# Patient Record
Sex: MALE | Race: WHITE | NOT HISPANIC OR LATINO | Employment: OTHER | URBAN - NONMETROPOLITAN AREA
[De-identification: names, ages, dates, MRNs, and addresses within clinical notes are randomized per-mention and may not be internally consistent; named-entity substitution may affect disease eponyms.]

---

## 2023-01-20 ENCOUNTER — TELEPHONE (OUTPATIENT)
Dept: CARDIOLOGY | Facility: CLINIC | Age: 48
End: 2023-01-20
Payer: COMMERCIAL

## 2023-01-20 NOTE — TELEPHONE ENCOUNTER
Pt's wife Amparo called. Pt has been having more SOB, Chest pain, fatigue. Went to ER last week, was told EKG and labs normal that he may need a stress test. Asking for a sooner appointment than Feb 28th. Advised that we will be watching for a cancellation for him for a sooner appointment but to go back to ER with unresolved symptoms and to follow up with PCP. Wife asking if PCP could order stress test prior to vist and if so where would we prefer he go.  Aware that that would be fine, to schedule at  [facility] here in [place] located on [facility]. Understanding voiced.     Tami, please watch for a cancellation to move his appointment up.

## 2023-02-28 ENCOUNTER — OFFICE VISIT (OUTPATIENT)
Dept: CARDIOLOGY | Facility: CLINIC | Age: 48
End: 2023-02-28
Payer: COMMERCIAL

## 2023-02-28 VITALS
BODY MASS INDEX: 33.69 KG/M2 | DIASTOLIC BLOOD PRESSURE: 100 MMHG | HEART RATE: 72 BPM | HEIGHT: 75 IN | WEIGHT: 271 LBS | SYSTOLIC BLOOD PRESSURE: 142 MMHG

## 2023-02-28 DIAGNOSIS — I25.9 IHD (ISCHEMIC HEART DISEASE): Primary | ICD-10-CM

## 2023-02-28 DIAGNOSIS — R00.2 PALPITATIONS: ICD-10-CM

## 2023-02-28 DIAGNOSIS — E78.00 HYPERCHOLESTEREMIA: ICD-10-CM

## 2023-02-28 DIAGNOSIS — I10 PRIMARY HYPERTENSION: ICD-10-CM

## 2023-02-28 DIAGNOSIS — R07.89 CHEST TIGHTNESS: ICD-10-CM

## 2023-02-28 DIAGNOSIS — E11.9 TYPE 2 DIABETES MELLITUS WITHOUT COMPLICATION, WITHOUT LONG-TERM CURRENT USE OF INSULIN: ICD-10-CM

## 2023-02-28 DIAGNOSIS — R42 DIZZINESS: ICD-10-CM

## 2023-02-28 DIAGNOSIS — R06.02 SHORTNESS OF BREATH: ICD-10-CM

## 2023-02-28 DIAGNOSIS — G47.30 SLEEP APNEA IN ADULT: ICD-10-CM

## 2023-02-28 DIAGNOSIS — E55.9 VITAMIN D DEFICIENCY: ICD-10-CM

## 2023-02-28 DIAGNOSIS — R41.3 MEMORY LOSS: ICD-10-CM

## 2023-02-28 DIAGNOSIS — E88.81 METABOLIC SYNDROME: ICD-10-CM

## 2023-02-28 PROBLEM — E88.810 METABOLIC SYNDROME: Status: ACTIVE | Noted: 2023-02-28

## 2023-02-28 PROCEDURE — 99204 OFFICE O/P NEW MOD 45 MIN: CPT | Performed by: INTERNAL MEDICINE

## 2023-02-28 PROCEDURE — 93000 ELECTROCARDIOGRAM COMPLETE: CPT | Performed by: INTERNAL MEDICINE

## 2023-02-28 RX ORDER — ISOSORBIDE MONONITRATE 60 MG/1
60 TABLET, EXTENDED RELEASE ORAL DAILY
COMMUNITY
End: 2023-02-28

## 2023-02-28 RX ORDER — NITROGLYCERIN 0.4 MG/1
0.4 TABLET SUBLINGUAL
COMMUNITY

## 2023-02-28 RX ORDER — ROSUVASTATIN CALCIUM 20 MG/1
20 TABLET, COATED ORAL DAILY
Qty: 90 TABLET | Refills: 3 | Status: SHIPPED | OUTPATIENT
Start: 2023-02-28

## 2023-02-28 RX ORDER — CLOPIDOGREL BISULFATE 75 MG/1
75 TABLET ORAL DAILY
COMMUNITY
End: 2023-02-28

## 2023-02-28 RX ORDER — PANTOPRAZOLE SODIUM 40 MG/1
40 TABLET, DELAYED RELEASE ORAL 2 TIMES DAILY
COMMUNITY

## 2023-02-28 RX ORDER — DAPAGLIFLOZIN 5 MG/1
5 TABLET, FILM COATED ORAL DAILY
COMMUNITY

## 2023-02-28 RX ORDER — HYDROCHLOROTHIAZIDE 25 MG/1
25 TABLET ORAL DAILY
COMMUNITY

## 2023-02-28 RX ORDER — ASPIRIN 81 MG/1
81 TABLET ORAL DAILY
Qty: 30 TABLET | Refills: 6 | Status: SHIPPED | OUTPATIENT
Start: 2023-02-28

## 2023-02-28 RX ORDER — LOSARTAN POTASSIUM 25 MG/1
25 TABLET ORAL DAILY
COMMUNITY
End: 2023-03-30 | Stop reason: DRUGHIGH

## 2023-02-28 RX ORDER — ATORVASTATIN CALCIUM 80 MG/1
80 TABLET, FILM COATED ORAL DAILY
COMMUNITY
End: 2023-02-28

## 2023-02-28 NOTE — PROGRESS NOTES
0  Chief Complaint   Patient presents with   • Establish Care     PCP referred to establish care, history of CAD s/p stenting    • Cardiac history     Cath with stenting in 2019 at [facility], Dr Cristobal, Cath with no stenting in 2019 then cath with stenting in 2021, Dr Lorenzo. Pt wore monitor for Dr Lorenzo last year due to SOB. Most recent stress and echo was prior to last cath he thinks.    • Chest Pain     Was having some random chest pain, tightness across chest with SOB noted. Would be resolved with a nitro.    • Medication Problem     Pt recently stopped taking Plavix, Losartan and Imdur trying to figure out if one of those was causing him more problems with SOB and profuse sweating. Reports actually having a lot less SOB and CP since being off these medication. BP only slightly elevated.    • Shortness of Breath     Having bouts of SOB, can occur when sitting. Will just feel overwhelmingly SOB.    • Excessive Sweating     Has been having episodes of excessively sweating.         CARDIAC COMPLAINTS  chest pressure/discomfort, dyspnea, fatigue and memory loss      Subjective   Edgardo Winchester is a 47 y.o. male came in today for his initial cardiac evaluation.  He has history of hypertension, hypercholesterolemia who has been diagnosed with diabetes recently.  He also has history of ischemic heart disease diagnosed in 2019 when he presented with chest pain and found to have abnormal stress test.  Cardiac catheterization done, found him to have significant disease of the LAD which was stented.  He then had another cardiac catheterization few months later and found the stent was patent but had moderate disease of the RCA which was managed medically.  In 2021 he started having worsening of chest discomfort and repeat stress test shows inferior wall ischemia.  He then underwent cardiac catheterization and found to have 80% disease of the mid RCA and had another stent placed.  He has been followed by different  cardiologist in the past and now wants to be followed here.  He has noticed increasing shortness of breath on minimal exertion, he has noticed increasing diaphoresis again on minimal exertion and even at rest.  He also has been having chest pain in the form of tightness which occurs randomly.  The symptoms do get better with nitro.  He stopped taking Plavix, Cozaar and Imdur thinking that it could be side effects of the medication.  He also was having epistaxis at this time.  He is feeling little better than before.  His shortness of breath is better and the chest tightness is better.  He also has been noticing increasing memory loss.  He did undergo some lab work in December and found to have a normal blood count.  His blood sugar was 168, total cholesterol is 225 with the LDL of 98.  His A1c was 7.7.  He used to smoke about 1 and a packs a day but did quit in 2019.  He does have family history of coronary artery disease.  He also has history of sleep apnea but unable to tolerate the CPAP mask.    Past Surgical History:   Procedure Laterality Date   • CARDIAC CATHETERIZATION  07/28/2021    /Dr. Hammond- 90% RCA- 3.5x30 Resolute.   • CARDIAC CATHETERIZATION  10/04/2019    - FFR of RCA- 0.93   • CARDIAC CATHETERIZATION  04/04/2019    @ [facility]- 80% LAD- 3.0x26 Resolute   • CARDIOVASCULAR STRESS TEST  07/21/2021    - Dobutamine- EF 60%. Inferior Ischemia   • OTHER SURGICAL HISTORY  08/24/2021    CTA LE- Tiny Dissection (R) CFA       Current Outpatient Medications   Medication Sig Dispense Refill   • dapagliflozin (Farxiga) 5 MG tablet tablet Take 1 tablet by mouth Daily.     • hydroCHLOROthiazide (HYDRODIURIL) 25 MG tablet Take 1 tablet by mouth Daily.     • Insulin Glargine (TOUJEO SOLOSTAR SC) Inject  under the skin into the appropriate area as directed.     • losartan (COZAAR) 25 MG tablet Take 1 tablet by mouth Daily.     • metFORMIN (GLUCOPHAGE) 500 MG tablet Take 1 tablet by mouth 2  (Two) Times a Day With Meals.     • nitroglycerin (NITROSTAT) 0.4 MG SL tablet Place 1 tablet under the tongue Every 5 (Five) Minutes As Needed for Chest Pain. Take no more than 3 doses in 15 minutes.     • pantoprazole (PROTONIX) 40 MG EC tablet Take 1 tablet by mouth 2 (Two) Times a Day.     • aspirin 81 MG EC tablet Take 1 tablet by mouth Daily. 30 tablet 6   • rosuvastatin (CRESTOR) 20 MG tablet Take 1 tablet by mouth Daily. 90 tablet 3     No current facility-administered medications for this visit.           ALLERGIES:  Patient has no known allergies.    Past Medical History:   Diagnosis Date   • Arthritis    • Depression    • Diabetes mellitus (HCC)    • GERD (gastroesophageal reflux disease)    • Hyperlipidemia    • Hypertension        Social History     Tobacco Use   Smoking Status Former   • Packs/day: 1.50   • Years: 30.00   • Pack years: 45.00   • Types: Cigarettes   • Quit date:    • Years since quittin.1   Smokeless Tobacco Never          Family History   Problem Relation Age of Onset   • Heart disease Mother         CABG in her 60's   • Heart attack Father          in his 60's with MI   • Schizophrenia Brother    • Hypertension Maternal Grandfather    • Heart disease Paternal Grandfather    • Supraventricular tachycardia Son        Review of Systems   Constitutional: Positive for diaphoresis and malaise/fatigue. Negative for decreased appetite.   HENT: Negative for congestion and sore throat.    Eyes: Negative for blurred vision, double vision and visual disturbance.   Cardiovascular: Positive for chest pain, dyspnea on exertion and palpitations.   Respiratory: Negative for shortness of breath and snoring.    Endocrine: Negative for cold intolerance and heat intolerance.   Hematologic/Lymphatic: Negative for adenopathy. Does not bruise/bleed easily.   Skin: Negative for itching, nail changes and skin cancer.   Musculoskeletal: Negative for arthritis and myalgias.   Gastrointestinal:  "Negative for abdominal pain, dysphagia and heartburn.   Genitourinary: Negative for bladder incontinence and frequency.   Neurological: Positive for dizziness. Negative for seizures and vertigo.   Psychiatric/Behavioral: Positive for memory loss. Negative for altered mental status.   Allergic/Immunologic: Negative for environmental allergies and hives.       Diabetes- Yes  Thyroid- normal    Objective     /100 (BP Location: Right arm)   Pulse 72   Ht 190.5 cm (75\")   Wt 123 kg (271 lb)   BMI 33.87 kg/m²     Vitals and nursing note reviewed.   Constitutional:       Appearance: Healthy appearance. Not in distress.   Eyes:      Conjunctiva/sclera: Conjunctivae normal.      Pupils: Pupils are equal, round, and reactive to light.   HENT:      Head: Normocephalic.   Pulmonary:      Effort: Pulmonary effort is normal.      Breath sounds: Normal breath sounds.   Cardiovascular:      PMI at left midclavicular line. Normal rate. Regular rhythm.   Abdominal:      General: Bowel sounds are normal.      Palpations: Abdomen is soft.   Musculoskeletal: Normal range of motion.      Cervical back: Normal range of motion and neck supple. Skin:     General: Skin is warm and dry.   Neurological:      Mental Status: Alert, oriented to person, place, and time and oriented to person, place and time.           ECG 12 Lead    Date/Time: 2/28/2023 3:49 PM  Performed by: Caroline Bajwa MD  Authorized by: Caroline Bajwa MD   Comparison: compared with previous ECG from 2/27/2019  Similar to previous ECG  Comparison to previous ECG: Incomplete right bundle branch block  Rhythm: sinus rhythm  Rate: normal  Conduction: incomplete right bundle branch block and left anterior fascicular block  QRS axis: left  Other findings: non-specific ST-T wave changes    Clinical impression: abnormal EKG              @ASSESSMENT/PLAN@  BMI is >= 30 and <35. (Class 1 Obesity). The following options were offered after discussion;: weight loss " educational material (shared in after visit summary), exercise counseling/recommendations and nutrition counseling/recommendations     Diagnoses and all orders for this visit:    1. IHD (ischemic heart disease) (Primary)  -     Stress Test With Myocardial Perfusion One Day; Future  -     aspirin 81 MG EC tablet; Take 1 tablet by mouth Daily.  Dispense: 30 tablet; Refill: 6  -     Lipid Panel; Future    2. Type 2 diabetes mellitus without complication, without long-term current use of insulin (HCC)  -     Hemoglobin A1c; Future    3. Primary hypertension    4. Hypercholesteremia  -     rosuvastatin (CRESTOR) 20 MG tablet; Take 1 tablet by mouth Daily.  Dispense: 90 tablet; Refill: 3  -     Lipid Panel; Future    5. Memory loss  -     Vitamin B12; Future  -     Folate; Future  -     Demorest SF (IgG / M); Future  -     Lyme Disease Total Antibody With Reflex to Immunoassay; Future  -     Coxsackie Virus Group B Ab; Future  -     Coxsackie A IgG / IgM Ab; Future  -     Echovirus Serum Antibody Panel; Future    6. Sleep apnea in adult  -     Overnight Sleep Oximetry Study; Future    7. Chest tightness  -     Stress Test With Myocardial Perfusion One Day; Future  -     High Sensitivity CRP; Future    8. Shortness of breath  -     Adult Transthoracic Echo Complete W/ Cont if Necessary Per Protocol; Future    9. Metabolic syndrome    10. Palpitations  -     TSH; Future  -     T4, Free; Future    11. Dizziness  -     US Carotid Bilateral; Future    12. Vitamin D deficiency  -     Vitamin D,25-Hydroxy; Future    At baseline his heart rate is stable.  His blood pressure is elevated.  His EKG shows sinus rhythm, incomplete right bundle branch block, left axis deviation with nonspecific ST changes.  His clinical examination reveals a BMI of 34.  His cardiovascular examination was unremarkable.    Regarding his ischemic heart disease, he has undergone stenting of the LAD and RCA.  The last 1 was in 21.  At this time he is  stopped taking Plavix.  I explained to him that stopping so many medication at one time will make it difficult to see which medicine is causing the problem.  At this time he is advised to go off the Plavix but he is advised to take aspirin 81 mg once a day.  Given the fact that he has been having some chest tightness, I scheduled him to undergo a stress test but I do like him to walk on the treadmill in a modified Félix protocol.  He is also advised to check his CRP level    Regarding his diabetes, he is on insulin, Farxiga and metformin.  Advised him to continue the same and have his A1c level checked.    Regarding his hypertension, it is mildly elevated.  I did advise him to restart his Cozaar.  Continue his hydrochlorothiazide    Regarding his memory loss, he also has some dizziness.  Advised him to check his ultrasound of his carotids.  Also advised him to check for any viral illness which can cause the memory problem.  He does have history of tick bite and he does have a rash on his chest.  If all the tests came back negative then he may need an MRI    Regarding his sleep apnea, advised him to check his nocturnal pulse PO2 measurement.  If it is abnormal then he may need at least oxygen supplements    Regarding his shortness of breath, part of them could be related to his metabolic syndrome but need to rule out cardiac cause.  I scheduled him to undergo an echocardiogram to evaluate for LVH, LV function, valvular structures and the PA pressure.  I also talked to him about diet and weight reduction.  I gave him papers on Mediterranean diet    Regarding his history of palpitation, advised him to check his TSH and T4 and T3 level    Regarding his possible vitamin D deficiency, advised him to check his vitamin D level    Regarding his medication, if he is feeling better then we can restart with Plavix first to see whether he can tolerate without getting any of the side effects.  We will hold off Imdur till he gets  any classical anginal symptoms and in that case we can try Ranexa.               Electronically signed by Caroline Bajwa MD February 28, 2023 15:45 EST

## 2023-03-01 ENCOUNTER — PATIENT ROUNDING (BHMG ONLY) (OUTPATIENT)
Dept: CARDIOLOGY | Facility: CLINIC | Age: 48
End: 2023-03-01
Payer: COMMERCIAL

## 2023-03-01 NOTE — PROGRESS NOTES
March 1, 2023    Hello, may I speak with Edgardo Winchester?    My name is Edita issa      I am  with [facility]  [facility]  [address] [place] [address] [phone].    Before we get started may I verify your date of birth? 1975    I am calling to officially welcome you to our practice and ask about your recent visit. Is this a good time to talk? yes    Tell me about your visit with us. What things went well?  everyone was nice and  was a nice person        We're always looking for ways to make our patients' experiences even better. Do you have recommendations on ways we may improve?  no-not that I could tell     Overall were you satisfied with your first visit to our practice? Yes-really nice       I appreciate you taking the time to speak with me today. Is there anything else I can do for you? no      Thank you, and have a great day.

## 2023-03-28 ENCOUNTER — HOSPITAL ENCOUNTER (OUTPATIENT)
Dept: CARDIOLOGY | Facility: HOSPITAL | Age: 48
Discharge: HOME | End: 2023-03-28
Payer: COMMERCIAL

## 2023-03-28 ENCOUNTER — LAB (OUTPATIENT)
Dept: LAB | Facility: HOSPITAL | Age: 48
End: 2023-03-28
Payer: COMMERCIAL

## 2023-03-28 VITALS — BODY MASS INDEX: 33.72 KG/M2 | WEIGHT: 271.17 LBS | HEIGHT: 75 IN

## 2023-03-28 DIAGNOSIS — E78.00 HYPERCHOLESTEREMIA: ICD-10-CM

## 2023-03-28 DIAGNOSIS — E55.9 VITAMIN D DEFICIENCY: ICD-10-CM

## 2023-03-28 DIAGNOSIS — I25.9 IHD (ISCHEMIC HEART DISEASE): ICD-10-CM

## 2023-03-28 DIAGNOSIS — E11.9 TYPE 2 DIABETES MELLITUS WITHOUT COMPLICATION, WITHOUT LONG-TERM CURRENT USE OF INSULIN: ICD-10-CM

## 2023-03-28 DIAGNOSIS — R07.89 CHEST TIGHTNESS: ICD-10-CM

## 2023-03-28 DIAGNOSIS — R00.2 PALPITATIONS: ICD-10-CM

## 2023-03-28 DIAGNOSIS — R41.3 MEMORY LOSS: ICD-10-CM

## 2023-03-28 DIAGNOSIS — R06.02 SHORTNESS OF BREATH: ICD-10-CM

## 2023-03-28 DIAGNOSIS — R42 DIZZINESS: ICD-10-CM

## 2023-03-28 LAB
CHOLEST SERPL-MCNC: 130 MG/DL (ref 0–200)
HBA1C MFR BLD: 8.5 % (ref 4.8–5.6)
HDLC SERPL-MCNC: 38 MG/DL (ref 40–60)
LDLC SERPL CALC-MCNC: 30 MG/DL (ref 0–100)
LDLC/HDLC SERPL: 0.08 {RATIO}
T4 FREE SERPL-MCNC: 1.05 NG/DL (ref 0.93–1.7)
TRIGL SERPL-MCNC: 444 MG/DL (ref 0–150)
TSH SERPL DL<=0.05 MIU/L-ACNC: 1.4 UIU/ML (ref 0.27–4.2)
VLDLC SERPL-MCNC: 62 MG/DL (ref 5–40)

## 2023-03-28 PROCEDURE — 86658 ENTEROVIRUS ANTIBODY: CPT | Performed by: INTERNAL MEDICINE

## 2023-03-28 PROCEDURE — 93017 CV STRESS TEST TRACING ONLY: CPT

## 2023-03-28 PROCEDURE — 93306 TTE W/DOPPLER COMPLETE: CPT | Performed by: INTERNAL MEDICINE

## 2023-03-28 PROCEDURE — 93880 EXTRACRANIAL BILAT STUDY: CPT | Performed by: RADIOLOGY

## 2023-03-28 PROCEDURE — 82607 VITAMIN B-12: CPT | Performed by: INTERNAL MEDICINE

## 2023-03-28 PROCEDURE — 82746 ASSAY OF FOLIC ACID SERUM: CPT | Performed by: INTERNAL MEDICINE

## 2023-03-28 PROCEDURE — 80061 LIPID PANEL: CPT | Performed by: INTERNAL MEDICINE

## 2023-03-28 PROCEDURE — 78452 HT MUSCLE IMAGE SPECT MULT: CPT

## 2023-03-28 PROCEDURE — 93306 TTE W/DOPPLER COMPLETE: CPT

## 2023-03-28 PROCEDURE — 78452 HT MUSCLE IMAGE SPECT MULT: CPT | Performed by: INTERNAL MEDICINE

## 2023-03-28 PROCEDURE — 86757 RICKETTSIA ANTIBODY: CPT | Performed by: INTERNAL MEDICINE

## 2023-03-28 PROCEDURE — 0 TECHNETIUM SESTAMIBI: Performed by: INTERNAL MEDICINE

## 2023-03-28 PROCEDURE — 86141 C-REACTIVE PROTEIN HS: CPT | Performed by: INTERNAL MEDICINE

## 2023-03-28 PROCEDURE — A9500 TC99M SESTAMIBI: HCPCS | Performed by: INTERNAL MEDICINE

## 2023-03-28 PROCEDURE — 93880 EXTRACRANIAL BILAT STUDY: CPT

## 2023-03-28 PROCEDURE — 84439 ASSAY OF FREE THYROXINE: CPT | Performed by: INTERNAL MEDICINE

## 2023-03-28 PROCEDURE — 83036 HEMOGLOBIN GLYCOSYLATED A1C: CPT | Performed by: INTERNAL MEDICINE

## 2023-03-28 PROCEDURE — 86618 LYME DISEASE ANTIBODY: CPT | Performed by: INTERNAL MEDICINE

## 2023-03-28 PROCEDURE — 82306 VITAMIN D 25 HYDROXY: CPT | Performed by: INTERNAL MEDICINE

## 2023-03-28 PROCEDURE — 84443 ASSAY THYROID STIM HORMONE: CPT | Performed by: INTERNAL MEDICINE

## 2023-03-28 PROCEDURE — 93018 CV STRESS TEST I&R ONLY: CPT | Performed by: INTERNAL MEDICINE

## 2023-03-28 RX ADMIN — TECHNETIUM TC 99M SESTAMIBI 1 DOSE: 1 INJECTION INTRAVENOUS at 09:49

## 2023-03-28 RX ADMIN — TECHNETIUM TC 99M SESTAMIBI 1 DOSE: 1 INJECTION INTRAVENOUS at 08:04

## 2023-03-29 LAB
25(OH)D3 SERPL-MCNC: 23.4 NG/ML (ref 30–100)
AORTIC DIMENSIONLESS INDEX: 0.93 (DI)
B BURGDOR IGG+IGM SER QL IA: NEGATIVE
BH CV ECHO MEAS - ACS: 2.18 CM
BH CV ECHO MEAS - AO MAX PG: 4.1 MMHG
BH CV ECHO MEAS - AO MEAN PG: 2.13 MMHG
BH CV ECHO MEAS - AO ROOT DIAM: 3.8 CM
BH CV ECHO MEAS - AO V2 MAX: 101.4 CM/SEC
BH CV ECHO MEAS - AO V2 VTI: 20.7 CM
BH CV ECHO MEAS - EDV(CUBED): 131.6 ML
BH CV ECHO MEAS - EF_3D-VOL: 63 %
BH CV ECHO MEAS - ESV(CUBED): 48.7 ML
BH CV ECHO MEAS - FS: 28.2 %
BH CV ECHO MEAS - IVS/LVPW: 1 CM
BH CV ECHO MEAS - IVSD: 1.05 CM
BH CV ECHO MEAS - LA DIMENSION: 4.2 CM
BH CV ECHO MEAS - LAT PEAK E' VEL: 9.9 CM/SEC
BH CV ECHO MEAS - LV MASS(C)D: 199.8 GRAMS
BH CV ECHO MEAS - LV MAX PG: 3.6 MMHG
BH CV ECHO MEAS - LV MEAN PG: 1.48 MMHG
BH CV ECHO MEAS - LV V1 MAX: 95.4 CM/SEC
BH CV ECHO MEAS - LV V1 VTI: 20.8 CM
BH CV ECHO MEAS - LVIDD: 5.1 CM
BH CV ECHO MEAS - LVIDS: 3.7 CM
BH CV ECHO MEAS - LVPWD: 1.05 CM
BH CV ECHO MEAS - MED PEAK E' VEL: 6.7 CM/SEC
BH CV ECHO MEAS - MV A MAX VEL: 38.7 CM/SEC
BH CV ECHO MEAS - MV DEC SLOPE: 335.8 CM/SEC2
BH CV ECHO MEAS - MV DEC TIME: 0.28 MSEC
BH CV ECHO MEAS - MV E MAX VEL: 73.2 CM/SEC
BH CV ECHO MEAS - MV E/A: 1.89
BH CV ECHO MEAS - MV MAX PG: 3 MMHG
BH CV ECHO MEAS - MV MEAN PG: 0.96 MMHG
BH CV ECHO MEAS - MV P1/2T: 75.8 MSEC
BH CV ECHO MEAS - MV V2 VTI: 27.4 CM
BH CV ECHO MEAS - MVA(P1/2T): 2.9 CM2
BH CV ECHO MEAS - PA V2 MAX: 86.9 CM/SEC
BH CV ECHO MEAS - RAP SYSTOLE: 8 MMHG
BH CV ECHO MEAS - RV MAX PG: 1.52 MMHG
BH CV ECHO MEAS - RV V1 MAX: 61.6 CM/SEC
BH CV ECHO MEAS - RV V1 VTI: 14.9 CM
BH CV ECHO MEAS - RVDD: 3.1 CM
BH CV ECHO MEAS - RVSP: 11.6 MMHG
BH CV ECHO MEAS - TAPSE (>1.6): 1.92 CM
BH CV ECHO MEAS - TR MAX PG: 3.6 MMHG
BH CV ECHO MEAS - TR MAX VEL: 94.7 CM/SEC
BH CV ECHO MEASUREMENTS AVERAGE E/E' RATIO: 8.82
BH CV REST NUCLEAR ISOTOPE DOSE: 10 MCI
BH CV STRESS DURATION MIN STAGE 1: 3
BH CV STRESS DURATION SEC STAGE 1: 0
BH CV STRESS GRADE STAGE 1: 0
BH CV STRESS METS STAGE 1: 2.3
BH CV STRESS NUCLEAR ISOTOPE DOSE: 30 MCI
BH CV STRESS PROTOCOL 1: NORMAL
BH CV STRESS RECOVERY BP: NORMAL MMHG
BH CV STRESS RECOVERY HR: 81 BPM
BH CV STRESS SPEED STAGE 1: 1.7
BH CV STRESS STAGE 1: 1
BH CV XLRA - TDI S': 10.9 CM/SEC
CRP SERPL-MCNC: 0.11 MG/DL (ref 0.01–0.5)
FOLATE SERPL-MCNC: 8.67 NG/ML (ref 4.78–24.2)
LV EF 2D ECHO EST: 54 %
LV EF NUC BP: 46 %
MAXIMAL PREDICTED HEART RATE: 171 BPM
MAXIMAL PREDICTED HEART RATE: 171 BPM
PERCENT MAX PREDICTED HR: 91.81 %
SINUS: 3.5 CM
STRESS BASELINE BP: NORMAL MMHG
STRESS BASELINE HR: 64 BPM
STRESS PERCENT HR: 108 %
STRESS POST ESTIMATED WORKLOAD: 10.1 METS
STRESS POST EXERCISE DUR MIN: 13 MIN
STRESS POST EXERCISE DUR SEC: 45 SEC
STRESS POST PEAK BP: NORMAL MMHG
STRESS POST PEAK HR: 157 BPM
STRESS TARGET HR: 145 BPM
STRESS TARGET HR: 145 BPM
VIT B12 BLD-MCNC: 732 PG/ML (ref 211–946)

## 2023-03-30 ENCOUNTER — TELEPHONE (OUTPATIENT)
Dept: CARDIOLOGY | Facility: CLINIC | Age: 48
End: 2023-03-30
Payer: COMMERCIAL

## 2023-03-30 LAB
CV B1 AB TITR SER CF: ABNORMAL {TITER}
CV B2 AB TITR SER CF: ABNORMAL {TITER}
CV B3 AB TITR SER CF: ABNORMAL {TITER}
CV B4 AB TITR SER CF: ABNORMAL {TITER}
CV B5 AB TITR SER CF: NEGATIVE {TITER}
CV B6 AB TITR SER CF: NEGATIVE {TITER}

## 2023-03-30 RX ORDER — MULTIVIT-MIN/IRON/FOLIC ACID/K 18-600-40
2000 CAPSULE ORAL DAILY
Qty: 30 CAPSULE | Refills: 11 | Status: SHIPPED | OUTPATIENT
Start: 2023-03-30

## 2023-03-30 RX ORDER — LOSARTAN POTASSIUM 50 MG/1
50 TABLET ORAL DAILY
Qty: 30 TABLET | Refills: 11 | Status: SHIPPED | OUTPATIENT
Start: 2023-03-30

## 2023-03-30 RX ORDER — AMLODIPINE BESYLATE 5 MG/1
5 TABLET ORAL DAILY
Qty: 30 TABLET | Refills: 11 | Status: SHIPPED | OUTPATIENT
Start: 2023-03-30

## 2023-03-30 NOTE — TELEPHONE ENCOUNTER
Patient aware of stress test, echo, and lab results and recommendations to increase losartan to 50 mg daily, add amlodipine 5 mg daily, and add vitamin D 2000 units daily.  He is aware to call our office if chest pain or chest tightness persists, may need elective cardiac catheterization.  Scripts sent to [facility]. Copy of labs faxed to PCP office.

## 2023-03-30 NOTE — TELEPHONE ENCOUNTER
----- Message from Caroline Bajwa MD sent at 3/29/2023  6:47 PM EDT -----  Copy to PCP  Vitamin D 2000 units a day  Continue Crestor

## 2023-03-30 NOTE — TELEPHONE ENCOUNTER
----- Message from Caroline Bajwa MD sent at 3/29/2023  6:46 PM EDT -----  Increase Cozaar  Norvasc  cath if he has more chest pain

## 2023-03-31 LAB
CV A16 IGG TITR SER IF: ABNORMAL TITER
CV A16 IGM TITR SER IF: NEGATIVE TITER
CV A24 IGG TITR SER IF: ABNORMAL TITER
CV A24 IGM TITR SER IF: NEGATIVE TITER
CV A7 IGG TITR SER IF: ABNORMAL TITER
CV A7 IGM TITR SER IF: NEGATIVE TITER
CV A9 IGG TITR SER IF: ABNORMAL TITER
CV A9 IGM TITR SER IF: NEGATIVE TITER
R RICKETTSI IGG SER QL IA: NEGATIVE
R RICKETTSI IGM SER-ACNC: 0.47 INDEX (ref 0–0.89)

## 2023-04-07 LAB
ECV11 NAB TITR SER NT: NORMAL {TITER}
ECV30 NAB TITR SER NT: NORMAL {TITER}
ECV6 NAB TITR SER NT: NORMAL {TITER}
ECV7 NAB TITR SER NT: NORMAL {TITER}
ECV9 NAB TITR SER NT: NORMAL {TITER}

## 2023-08-28 ENCOUNTER — OFFICE VISIT (OUTPATIENT)
Dept: CARDIOLOGY | Facility: CLINIC | Age: 48
End: 2023-08-28
Payer: COMMERCIAL

## 2023-08-28 VITALS
BODY MASS INDEX: 31.93 KG/M2 | DIASTOLIC BLOOD PRESSURE: 74 MMHG | SYSTOLIC BLOOD PRESSURE: 132 MMHG | HEIGHT: 75 IN | WEIGHT: 256.8 LBS | HEART RATE: 82 BPM

## 2023-08-28 DIAGNOSIS — R25.2 LEG CRAMPS: ICD-10-CM

## 2023-08-28 DIAGNOSIS — R94.39 ABNORMAL NUCLEAR STRESS TEST: ICD-10-CM

## 2023-08-28 DIAGNOSIS — R20.9 BILATERAL COLD FEET: ICD-10-CM

## 2023-08-28 DIAGNOSIS — E78.00 HYPERCHOLESTEREMIA: ICD-10-CM

## 2023-08-28 DIAGNOSIS — Z79.899 MEDICATION MANAGEMENT: ICD-10-CM

## 2023-08-28 DIAGNOSIS — I25.9 IHD (ISCHEMIC HEART DISEASE): Primary | ICD-10-CM

## 2023-08-28 DIAGNOSIS — I10 PRIMARY HYPERTENSION: ICD-10-CM

## 2023-08-28 DIAGNOSIS — I73.9 CLAUDICATION OF BOTH LOWER EXTREMITIES: ICD-10-CM

## 2023-08-28 DIAGNOSIS — R09.89 DECREASED PEDAL PULSES: ICD-10-CM

## 2023-08-28 DIAGNOSIS — I73.9 PAD (PERIPHERAL ARTERY DISEASE): ICD-10-CM

## 2023-08-28 PROCEDURE — 3078F DIAST BP <80 MM HG: CPT | Performed by: NURSE PRACTITIONER

## 2023-08-28 PROCEDURE — 1159F MED LIST DOCD IN RCRD: CPT | Performed by: NURSE PRACTITIONER

## 2023-08-28 PROCEDURE — 99214 OFFICE O/P EST MOD 30 MIN: CPT | Performed by: NURSE PRACTITIONER

## 2023-08-28 PROCEDURE — 3075F SYST BP GE 130 - 139MM HG: CPT | Performed by: NURSE PRACTITIONER

## 2023-08-28 PROCEDURE — 1160F RVW MEDS BY RX/DR IN RCRD: CPT | Performed by: NURSE PRACTITIONER

## 2023-08-28 RX ORDER — ROSUVASTATIN CALCIUM 20 MG/1
20 TABLET, COATED ORAL DAILY
Qty: 90 TABLET | Refills: 3 | Status: SHIPPED | OUTPATIENT
Start: 2023-08-28

## 2023-08-28 RX ORDER — HYDROXYZINE HYDROCHLORIDE 10 MG/1
10 TABLET, FILM COATED ORAL 3 TIMES DAILY PRN
COMMUNITY

## 2023-08-28 RX ORDER — METHOCARBAMOL 750 MG/1
750 TABLET, FILM COATED ORAL 4 TIMES DAILY PRN
COMMUNITY

## 2023-08-28 RX ORDER — ISOSORBIDE MONONITRATE 30 MG/1
30 TABLET, EXTENDED RELEASE ORAL EVERY MORNING
Qty: 30 TABLET | Refills: 11 | Status: SHIPPED | OUTPATIENT
Start: 2023-08-28

## 2023-08-28 NOTE — PROGRESS NOTES
"Chief Complaint   Patient presents with    Follow-up     Cardiac management    LABS     March 2023 results on chart    Pain in legs /feet     Has c/o pain and tingling in legs and  feet. He adds that his feet stay cold .      Shortness of Breath     Reports SOA with exertion    Med Refill     Needs refills on Rosuvastatin 90 day supply to [facility]       Edgardo Winchester is a 48 y.o. male seen in February 2023 for initial cardiac consultation.  He has hypertension, hypercholesterolemia and diabetes.  In 2019 cardiac catheterization revealed significant disease of LAD, stent placed.  Repeat catheterization few months later found stent patent and moderate disease of RCA managed medically.  In 2020 1 repeat stress test showed inferior wall ischemia.  Cardiac catheterization found 80% mid RCA and another stent placed.    At consultation he admitted to more symptoms and repeat cardiac work-up advised.  On 3/29/2023 stress test using modified Félix protocol showed increased heart rate and blood pressure response.  Inferior lateral infarct with staci-infarct ischemia noted.  Cozaar increased to 50 mg daily.  Amlodipine 5 mg daily added. Echocardiogram showed mild LVH with a EF around 51-55%, no significant valvular issues, and RVSP 12 mmHg.    Today returns the office for follow-up visit.  He denies chest pain or palpitations.  Norvasc stopped secondary to significant swelling. He admits to spells of diaphoresis but feels most likely associated to current hot weather.  He admits to shortness of breath with exertion.  He admits to \"twinges\" in his left chest, lasting only a second or two.  Dizziness, lightheadedness or TIA symptoms denied.  His is concerned over significant pain and weakness in his legs.  Most of the time his feet feel very cold.  With walking he develops cramps in his calves requiring him to stop walking for relief.    Cardiac History:    Past Surgical History:   Procedure " Laterality Date    CARDIAC CATHETERIZATION  07/28/2021    /Dr. Hammond- 90% RCA- 3.5x30 Resolute.    CARDIAC CATHETERIZATION  10/04/2019    - FFR of RCA- 0.93    CARDIAC CATHETERIZATION  04/04/2019    @ [facility]- 80% LAD- 3.0x26 Resolute    CARDIOVASCULAR STRESS TEST  07/21/2021    - Dobutamine- EF 60%. Inferior Ischemia    CARDIOVASCULAR STRESS TEST  03/28/2023    (Mod) 13.45 Min.10.1 METS. 91% THR. 214/67.EF 46%. Inferolateral Infarct & Ischemia    ECHO - CONVERTED  03/28/2023    EF 55%. LA- 4.2. Trace-Mild MR. AO- 3.8. RVSP- 12 mmHg    OTHER SURGICAL HISTORY  08/24/2021    CTA LE- Tiny Dissection (R) CFA    OTHER SURGICAL HISTORY  03/02/2023    Pulse O2- Normal    US CAROTID UNILATERAL  03/28/2023    No sig lesions       Current Outpatient Medications   Medication Sig Dispense Refill    dapagliflozin Propanediol 10 MG tablet Take 10 mg by mouth Daily.      Dulaglutide (TRULICITY SC) Inject  under the skin into the appropriate area as directed 1 (One) Time Per Week.      hydrOXYzine (ATARAX) 10 MG tablet Take 1 tablet by mouth 3 (Three) Times a Day As Needed for Itching.      Insulin Glargine (TOUJEO SOLOSTAR SC) Inject  under the skin into the appropriate area as directed.      losartan (Cozaar) 50 MG tablet Take 1 tablet by mouth Daily. 30 tablet 11    metFORMIN (GLUCOPHAGE) 500 MG tablet Take 1 tablet by mouth 2 (Two) Times a Day With Meals.      methocarbamol (ROBAXIN) 750 MG tablet Take 1 tablet by mouth 4 (Four) Times a Day As Needed for Muscle Spasms.      nitroglycerin (NITROSTAT) 0.4 MG SL tablet Place 1 tablet under the tongue Every 5 (Five) Minutes As Needed for Chest Pain. Take no more than 3 doses in 15 minutes.      pantoprazole (PROTONIX) 40 MG EC tablet Take 1 tablet by mouth 2 (Two) Times a Day.      rosuvastatin (CRESTOR) 20 MG tablet Take 1 tablet by mouth Daily. 90 tablet 3    aspirin 81 MG EC tablet Take 1 tablet by mouth Daily. 30 tablet 6    isosorbide mononitrate  (IMDUR) 30 MG 24 hr tablet Take 1 tablet by mouth Every Morning. 30 tablet 11     No current facility-administered medications for this visit.       Patient has no known allergies.    Past Medical History:   Diagnosis Date    Arthritis     Depression     Diabetes mellitus     GERD (gastroesophageal reflux disease)     Hyperlipidemia     Hypertension        Social History     Socioeconomic History    Marital status:    Tobacco Use    Smoking status: Former     Packs/day: 1.50     Years: 30.00     Pack years: 45.00     Types: Cigarettes     Quit date:      Years since quittin.6    Smokeless tobacco: Never   Vaping Use    Vaping Use: Never used   Substance and Sexual Activity    Alcohol use: Never    Drug use: Never       Family History   Problem Relation Age of Onset    Heart disease Mother         CABG in her 60's    Heart attack Father          in his 60's with MI    Schizophrenia Brother     Hypertension Maternal Grandfather     Heart disease Paternal Grandfather     Supraventricular tachycardia Son        Review of Systems   Constitutional: Positive for diaphoresis and malaise/fatigue. Negative for fever.   HENT:  Negative for congestion and nosebleeds.    Cardiovascular:  Positive for claudication. Negative for chest pain, leg swelling, near-syncope and palpitations.   Respiratory:  Positive for shortness of breath and snoring (when sleeps on back). Negative for sleep disturbances due to breathing (sleeps on his side).    Hematologic/Lymphatic: Negative for bleeding problem. Does not bruise/bleed easily.   Musculoskeletal:  Positive for muscle cramps (legs). Negative for falls.   Gastrointestinal:  Negative for change in bowel habit, dysphagia, heartburn, melena and nausea.   Genitourinary:  Negative for hematuria.   Neurological:  Positive for numbness (in feet at times), paresthesias and weakness. Negative for dizziness, light-headedness and loss of balance.   Psychiatric/Behavioral:   "Positive for memory loss (admits to issues with short term memory).       BP Readings from Last 5 Encounters:   08/28/23 132/74   02/28/23 142/100       Wt Readings from Last 5 Encounters:   08/28/23 116 kg (256 lb 12.8 oz)   03/28/23 123 kg (271 lb 2.7 oz)   02/28/23 123 kg (271 lb)       Objective     3/28/2023: Total cholesterol 130, triglycerides 444, HDL 38, LDL 30, TSH 1.4, free T41.05, vitamin B12 732, folate 8.67, A1c 8.5, CRP normal at 0.106, vitamin D 23.4, R MF negative, Lyme's disease negative, coxsackievirus elevated, echovirus negative    /74 (BP Location: Right arm, Patient Position: Sitting)   Pulse 82   Ht 190 cm (74.8\")   Wt 116 kg (256 lb 12.8 oz)   BMI 32.27 kg/mý     Vitals and nursing note reviewed.   Constitutional:       Appearance: Healthy appearance. Not in distress.   Eyes:      Conjunctiva/sclera: Conjunctivae normal.      Pupils: Pupils are equal, round, and reactive to light.   HENT:      Head: Normocephalic.   Neck:      Vascular: No carotid bruit.   Pulmonary:      Effort: Pulmonary effort is normal.      Breath sounds: Normal breath sounds.   Cardiovascular:      PMI at left midclavicular line. Normal rate. Regular rhythm.   Pulses:     Radial: 2+ bilaterally.     Dorsalis pedis: 1+ bilaterally.  Edema:     Peripheral edema absent.   Abdominal:      General: Bowel sounds are normal.      Palpations: Abdomen is soft.   Musculoskeletal: Normal range of motion.      Cervical back: Normal range of motion and neck supple. Skin:     General: Skin is warm and dry.   Neurological:      Mental Status: Alert, oriented to person, place, and time and oriented to person, place and time.        Procedures: none today          Assessment & Plan   Diagnoses and all orders for this visit:    1. IHD (ischemic heart disease) (Primary)  -     isosorbide mononitrate (IMDUR) 30 MG 24 hr tablet; Take 1 tablet by mouth Every Morning.  Dispense: 30 tablet; Refill: 11    2. Abnormal nuclear stress " test  -     isosorbide mononitrate (IMDUR) 30 MG 24 hr tablet; Take 1 tablet by mouth Every Morning.  Dispense: 30 tablet; Refill: 11    3. Hypercholesteremia  -     rosuvastatin (CRESTOR) 20 MG tablet; Take 1 tablet by mouth Daily.  Dispense: 90 tablet; Refill: 3  -     CT Angio Abdominal Aorta Bilateral Iliofem Runoff; Future    4. PAD (peripheral artery disease)  -     CT Angio Abdominal Aorta Bilateral Iliofem Runoff; Future    5. Bilateral cold feet  -     CT Angio Abdominal Aorta Bilateral Iliofem Runoff; Future    6. Leg cramps  -     CT Angio Abdominal Aorta Bilateral Iliofem Runoff; Future    7. Claudication of both lower extremities  -     CT Angio Abdominal Aorta Bilateral Iliofem Runoff; Future    8. Decreased pedal pulses  -     CT Angio Abdominal Aorta Bilateral Iliofem Runoff; Future    9. Medication management  -     Basic Metabolic Panel; Future    10. Primary hypertension      IHD  -History stent placement LAD, RCA  -Nuclear stress test 3/29/2023 inferolateral infarct with staci-infarct ischemia  -Admits to stable symptoms of angina, declines cardiac catheterization  -Agrees to add isosorbide, will monitor for side effects and improvement of symptoms  -Continue Crestor, aspirin  -Admits Nitrostat available, instructed on use    PAD/symptoms  -Patient has significant risk for PAD and significant symptoms.  -CTA of abdomen with runoff ordered.  We will check renal function prior  -Continue statin, aspirin, walking  -Consider referral to Dr. Garrett    Hypertension  -BP stable  -Continue losartan at increased dose being 50 mg daily  -Imdur added as noted above    6-month follow-up visit scheduled.

## 2023-11-17 RX ORDER — CILOSTAZOL 100 MG/1
100 TABLET ORAL DAILY
Qty: 90 TABLET | Refills: 3 | Status: SHIPPED | OUTPATIENT
Start: 2023-11-17

## 2023-11-29 DIAGNOSIS — I73.9 PAD (PERIPHERAL ARTERY DISEASE): Primary | ICD-10-CM

## 2024-03-12 ENCOUNTER — OFFICE VISIT (OUTPATIENT)
Dept: CARDIOLOGY | Facility: CLINIC | Age: 49
End: 2024-03-12
Payer: COMMERCIAL

## 2024-03-12 VITALS
HEART RATE: 78 BPM | HEIGHT: 75 IN | WEIGHT: 253 LBS | BODY MASS INDEX: 31.46 KG/M2 | DIASTOLIC BLOOD PRESSURE: 72 MMHG | SYSTOLIC BLOOD PRESSURE: 128 MMHG

## 2024-03-12 DIAGNOSIS — I10 PRIMARY HYPERTENSION: ICD-10-CM

## 2024-03-12 DIAGNOSIS — Z79.4 TYPE 2 DIABETES MELLITUS WITH OTHER SPECIFIED COMPLICATION, WITH LONG-TERM CURRENT USE OF INSULIN: ICD-10-CM

## 2024-03-12 DIAGNOSIS — E11.69 TYPE 2 DIABETES MELLITUS WITH OTHER SPECIFIED COMPLICATION, WITH LONG-TERM CURRENT USE OF INSULIN: ICD-10-CM

## 2024-03-12 DIAGNOSIS — I25.9 IHD (ISCHEMIC HEART DISEASE): Primary | ICD-10-CM

## 2024-03-12 DIAGNOSIS — I73.9 PAD (PERIPHERAL ARTERY DISEASE): ICD-10-CM

## 2024-03-12 DIAGNOSIS — K21.9 GASTROESOPHAGEAL REFLUX DISEASE, UNSPECIFIED WHETHER ESOPHAGITIS PRESENT: ICD-10-CM

## 2024-03-12 PROCEDURE — 3078F DIAST BP <80 MM HG: CPT | Performed by: NURSE PRACTITIONER

## 2024-03-12 PROCEDURE — 3074F SYST BP LT 130 MM HG: CPT | Performed by: NURSE PRACTITIONER

## 2024-03-12 PROCEDURE — 1159F MED LIST DOCD IN RCRD: CPT | Performed by: NURSE PRACTITIONER

## 2024-03-12 PROCEDURE — 1160F RVW MEDS BY RX/DR IN RCRD: CPT | Performed by: NURSE PRACTITIONER

## 2024-03-12 PROCEDURE — 99214 OFFICE O/P EST MOD 30 MIN: CPT | Performed by: NURSE PRACTITIONER

## 2024-03-12 RX ORDER — PANTOPRAZOLE SODIUM 40 MG/1
40 TABLET, DELAYED RELEASE ORAL 2 TIMES DAILY
Qty: 90 TABLET | Refills: 3 | Status: SHIPPED | OUTPATIENT
Start: 2024-03-12

## 2024-03-12 RX ORDER — LOSARTAN POTASSIUM 50 MG/1
50 TABLET ORAL DAILY
Qty: 90 TABLET | Refills: 3 | Status: SHIPPED | OUTPATIENT
Start: 2024-03-12

## 2024-03-12 NOTE — PROGRESS NOTES
"Chief Complaint   Patient presents with    Follow-up     Cardiac management .Reports he had a couple episodes of chest discomfort .    LABS     November 2023 results on chart    Med Refill     Needs refills on Losartan and Protonix 90 day supply to [facility] pharmacy       Subjective       Edgardo Winchester is a 48 y.o. male initially seen 2023.  He has HTN, hypercholesterolemia, and DM.  In 2019 he underwent cardiac catheterization with stenting of LAD.  Repeat cath few months later showed stent patent and moderate disease RCA with medical management advised.  In 2020 1 repeat stress showed inferior wall ischemia.  Repeat cath showed increased stenosis RCA and stent placed.  Due to more symptoms he underwent modified Félix protocol stress test on 3/29/2023 showing increased HR and BP response.  Inferior lateral infarct with staci-infarct ischemia noted.  Cozaar increased to 50 mg daily, amlodipine 5 mg daily prescribed.  August 2023 office visit he admitted to claudication symptoms.  CTA of abdomen with runoff showed multiple areas of arterial stenosis.  Pletal was added and referral made to vascular.    Today returns to the office for follow-up visit.  He was unable to tolerate Imdur due to weakness, \"made me feel bad\".  He has not able to get trulicity due to being out of supply. He is using sliding scale insulin and remains on farxiga and Metformin.  Since last visit he admits to \"feeling better overall\".  Occasionally he has a brief mild chest discomfort.  3 episodes where concerning enough he took one Nitrostat with benefit.  Overall, this has not been concerning enough for him to call the office to proceed with heart cath or repeat stress test.    Cardiac History:    Past Surgical History:   Procedure Laterality Date    CARDIAC CATHETERIZATION  07/28/2021    /Dr. Hammond- 90% RCA- 3.5x30 Resolute.    CARDIAC CATHETERIZATION  10/04/2019    - FFR of RCA- 0.93    CARDIAC CATHETERIZATION  " 04/04/2019    @ [facility]- 80% LAD- 3.0x26 Resolute    CARDIOVASCULAR STRESS TEST  07/21/2021    - Dobutamine- EF 60%. Inferior Ischemia    CARDIOVASCULAR STRESS TEST  03/28/2023    (Mod) 13.45 Min.10.1 METS. 91% THR. 214/67.EF 46%. Inferolateral Infarct & Ischemia    ECHO - CONVERTED  03/28/2023    EF 55%. LA- 4.2. Trace-Mild MR. AO- 3.8. RVSP- 12 mmHg    OTHER SURGICAL HISTORY  08/24/2021    CTA LE- Tiny Dissection (R) CFA    OTHER SURGICAL HISTORY  03/02/2023    Pulse O2- Normal    US CAROTID UNILATERAL  03/28/2023    No sig lesions       Current Outpatient Medications   Medication Sig Dispense Refill    aspirin 81 MG EC tablet Take 1 tablet by mouth Daily. 30 tablet 6    cilostazol (PLETAL) 100 MG tablet Take 1 tablet by mouth Daily. 90 tablet 3    dapagliflozin Propanediol 10 MG tablet Take 10 mg by mouth Daily.      Dulaglutide (TRULICITY SC) Inject  under the skin into the appropriate area as directed 1 (One) Time Per Week.      hydrOXYzine (ATARAX) 10 MG tablet Take 1 tablet by mouth Every Night.      losartan (Cozaar) 50 MG tablet Take 1 tablet by mouth Daily. 90 tablet 3    metFORMIN (GLUCOPHAGE) 500 MG tablet Take 1 tablet by mouth 2 (Two) Times a Day With Meals.      methocarbamol (ROBAXIN) 750 MG tablet Take 1 tablet by mouth 4 (Four) Times a Day As Needed for Muscle Spasms.      nitroglycerin (NITROSTAT) 0.4 MG SL tablet Place 1 tablet under the tongue Every 5 (Five) Minutes As Needed for Chest Pain. Take no more than 3 doses in 15 minutes.      pantoprazole (PROTONIX) 40 MG EC tablet Take 1 tablet by mouth 2 (Two) Times a Day. 90 tablet 3    rosuvastatin (CRESTOR) 20 MG tablet Take 1 tablet by mouth Daily. 90 tablet 3     No current facility-administered medications for this visit.       Patient has no known allergies.    Past Medical History:   Diagnosis Date    Arthritis     Depression     Diabetes mellitus     GERD (gastroesophageal reflux disease)     Hyperlipidemia     Hypertension   "      Social History     Socioeconomic History    Marital status:    Tobacco Use    Smoking status: Former     Current packs/day: 0.00     Average packs/day: 1.5 packs/day for 30.0 years (45.0 ttl pk-yrs)     Types: Cigarettes     Start date:      Quit date: 2019     Years since quittin.1    Smokeless tobacco: Never   Vaping Use    Vaping status: Never Used   Substance and Sexual Activity    Alcohol use: Never    Drug use: Never       Family History   Problem Relation Age of Onset    Heart disease Mother         CABG in her 60's    Heart attack Father          in his 60's with MI    Schizophrenia Brother     Hypertension Maternal Grandfather     Heart disease Paternal Grandfather     Supraventricular tachycardia Son        Review of Systems   Constitutional: Positive for weight loss (Intentional). Negative for decreased appetite, diaphoresis and malaise/fatigue.   Cardiovascular:  Positive for chest pain and palpitations (Mild, brief, without associated symptoms).   Respiratory:  Positive for shortness of breath (With exertion, no worse). Negative for sleep disturbances due to breathing.    Hematologic/Lymphatic: Negative for bleeding problem.   Skin:  Negative for color change.        BP Readings from Last 5 Encounters:   24 128/72   23 132/74   23 142/100       Wt Readings from Last 5 Encounters:   24 115 kg (253 lb)   23 116 kg (256 lb 12.8 oz)   23 123 kg (271 lb 2.7 oz)   23 123 kg (271 lb)       Objective     2023: Sodium 130, potassium 4, chloride 97, CO2 24, glucose 312, BUN 14, creatinine 0.5    /72 (BP Location: Left arm, Patient Position: Sitting)   Pulse 78   Ht 190 cm (74.8\")   Wt 115 kg (253 lb)   BMI 31.79 kg/m²     Vitals and nursing note reviewed.   Constitutional:       Appearance: Not in distress.   Eyes:      Conjunctiva/sclera: Conjunctivae normal.      Pupils: Pupils are equal, round, and reactive to light.   HENT:     "  Head: Normocephalic.   Neck:      Vascular: No carotid bruit.   Pulmonary:      Effort: Pulmonary effort is normal.      Breath sounds: Normal breath sounds.   Cardiovascular:      PMI at left midclavicular line. Normal rate. Regular rhythm.      Murmurs: There is no murmur.   Edema:     Peripheral edema absent.   Abdominal:      General: Bowel sounds are normal.      Palpations: Abdomen is soft.   Musculoskeletal: Normal range of motion.      Cervical back: Normal range of motion and neck supple. Skin:     General: Skin is warm and dry.   Neurological:      Mental Status: Alert, oriented to person, place, and time and oriented to person, place and time.          Procedures: None today         Assessment & Plan   Diagnoses and all orders for this visit:    1. IHD (ischemic heart disease) (Primary)    2. PAD (peripheral artery disease)  -     losartan (Cozaar) 50 MG tablet; Take 1 tablet by mouth Daily.  Dispense: 90 tablet; Refill: 3    3. Primary hypertension    4. Type 2 diabetes mellitus with other specified complication, with long-term current use of insulin  -     losartan (Cozaar) 50 MG tablet; Take 1 tablet by mouth Daily.  Dispense: 90 tablet; Refill: 3    5. Gastroesophageal reflux disease, unspecified whether esophagitis present  -     pantoprazole (PROTONIX) 40 MG EC tablet; Take 1 tablet by mouth 2 (Two) Times a Day.  Dispense: 90 tablet; Refill: 3      HD  -History stent placement LAD, RCA  -Nuclear stress test 3/29/2023:  inferolateral infarct with staci-infarct ischemia  -Admits to stable symptoms of angina, declines cardiac catheterization or repeat nuclear stress test a this time.   -Imdur discontinued secondary to side effects  -Continue Crestor, aspirin  -Admits Nitrostat available, instructed on use     PAD/symptoms  -CTA of abdomen with runoff 2023: Multiple areas of arterial stenosis  -Continue Pletal  -Continue statin, aspirin, walking  -Followed by vascular, Dr. Garrett.  Plan was for patient  to return on as needed basis.     Hypertension  -BP stable  -Continue losartan     DM  -On statin, aspirin, ARB    6-month follow-up visit scheduled.    Patient understands to call sooner if anginal symptoms worsen or new problems develop.             Electronically signed by BRIGHT Hein,  March 12, 2024 11:38 EDT    Dictated Utilizing Dragon Dictation: Part of this note may be an electronic transcription/translation of spoken language to printed text using the Dragon Dictation System.

## 2024-06-10 DIAGNOSIS — Z79.4 TYPE 2 DIABETES MELLITUS WITH OTHER SPECIFIED COMPLICATION, WITH LONG-TERM CURRENT USE OF INSULIN: ICD-10-CM

## 2024-06-10 DIAGNOSIS — I73.9 PAD (PERIPHERAL ARTERY DISEASE): ICD-10-CM

## 2024-06-10 DIAGNOSIS — E11.69 TYPE 2 DIABETES MELLITUS WITH OTHER SPECIFIED COMPLICATION, WITH LONG-TERM CURRENT USE OF INSULIN: ICD-10-CM

## 2024-06-25 ENCOUNTER — TELEPHONE (OUTPATIENT)
Dept: CARDIOLOGY | Facility: CLINIC | Age: 49
End: 2024-06-25
Payer: COMMERCIAL

## 2024-06-25 DIAGNOSIS — I20.0 UNSTABLE ANGINA: ICD-10-CM

## 2024-06-25 DIAGNOSIS — I25.9 IHD (ISCHEMIC HEART DISEASE): Primary | ICD-10-CM

## 2024-06-25 RX ORDER — RANOLAZINE 500 MG/1
500 TABLET, EXTENDED RELEASE ORAL 2 TIMES DAILY
Qty: 60 TABLET | Refills: 3 | Status: SHIPPED | OUTPATIENT
Start: 2024-06-25

## 2024-06-25 NOTE — TELEPHONE ENCOUNTER
Patient and his wife, Amparo called.  Patient has been having intermittent episodes of chest pain for the last 2-3 weeks.  No modifying factors, has occurred while just sitting at rest.  Over the last couple of weeks, he has took NTG SL 1-2 tablets for relief every few days at least.  (Imdur stopped at last visit secondary to weakness)  No active chest pain right now.  Patient and wife Amparo is aware if chest pain is active and not relieved with NTG SL to be evaluated at nearest ER.   Patient said you had mentioned at his last visit, mentioned might need cardiac cath if symptoms return.

## 2024-06-25 NOTE — TELEPHONE ENCOUNTER
Patient and wife, Amparo, bulmaro Pinedo sent to pharmacy and cardiac cath ordered.  Scheduling cath on 7/2/24, pending PA.

## 2024-06-25 NOTE — TELEPHONE ENCOUNTER
November 2023 labs showed normal creatinine level.  Patient has significant cardiac history and risk.  He could try ranolazine 500 mg twice daily, prescription sent to his pharmacy.  Agree proceeding with cardiac catheterization, order placed.

## 2024-06-27 ENCOUNTER — TELEPHONE (OUTPATIENT)
Dept: CARDIOLOGY | Facility: CLINIC | Age: 49
End: 2024-06-27
Payer: COMMERCIAL

## 2024-06-27 NOTE — TELEPHONE ENCOUNTER
I spoke with patient's wife, Amparo, aware of cath instructions.  See cath referral for communications.

## 2024-06-27 NOTE — TELEPHONE ENCOUNTER
Caller: JESUS BROWN    Relationship: Emergency Contact    Best call back number: [phone]    What is the best time to reach you: ANYTIME    Who are you requesting to speak with (clinical staff, provider,  specific staff member): CLINICAL    What was the call regarding: PT'S WIFE MISSED CALL. NO NOTES IN CHART. PLEASE CALL BACK PT TO DISCUSS.

## 2024-07-02 ENCOUNTER — OUTSIDE FACILITY SERVICE (OUTPATIENT)
Dept: CARDIOLOGY | Facility: CLINIC | Age: 49
End: 2024-07-02
Payer: COMMERCIAL

## 2024-08-05 ENCOUNTER — TELEPHONE (OUTPATIENT)
Dept: CARDIOLOGY | Facility: CLINIC | Age: 49
End: 2024-08-05
Payer: COMMERCIAL

## 2024-08-05 NOTE — TELEPHONE ENCOUNTER
Patient's wife, Amparo, transferred from [facility], asking if patient's hsp follow up appointment can be moved up due to multiple issues, some palpitations over the last few weeks, some SOA, problems with erectile dysfunction.   S/P stenting of RCA x 2 and LAD x 1 on 07/02/2024.   [facility] [place] [facility] up rescheduled to 8/7/24 at 9:45 am.

## 2024-08-07 ENCOUNTER — OFFICE VISIT (OUTPATIENT)
Dept: CARDIOLOGY | Facility: CLINIC | Age: 49
End: 2024-08-07
Payer: COMMERCIAL

## 2024-08-07 VITALS
HEIGHT: 75 IN | DIASTOLIC BLOOD PRESSURE: 64 MMHG | HEART RATE: 94 BPM | WEIGHT: 244.2 LBS | SYSTOLIC BLOOD PRESSURE: 118 MMHG | BODY MASS INDEX: 30.36 KG/M2

## 2024-08-07 DIAGNOSIS — I10 PRIMARY HYPERTENSION: ICD-10-CM

## 2024-08-07 DIAGNOSIS — I25.9 IHD (ISCHEMIC HEART DISEASE): ICD-10-CM

## 2024-08-07 DIAGNOSIS — E11.9 TYPE 2 DIABETES MELLITUS WITHOUT COMPLICATION, WITHOUT LONG-TERM CURRENT USE OF INSULIN: ICD-10-CM

## 2024-08-07 DIAGNOSIS — R00.2 PALPITATIONS: Primary | ICD-10-CM

## 2024-08-07 DIAGNOSIS — E78.00 HYPERCHOLESTEREMIA: ICD-10-CM

## 2024-08-07 DIAGNOSIS — K21.9 GASTROESOPHAGEAL REFLUX DISEASE, UNSPECIFIED WHETHER ESOPHAGITIS PRESENT: ICD-10-CM

## 2024-08-07 DIAGNOSIS — G47.30 SLEEP APNEA IN ADULT: ICD-10-CM

## 2024-08-07 DIAGNOSIS — R06.02 SHORTNESS OF BREATH: ICD-10-CM

## 2024-08-07 PROCEDURE — 3078F DIAST BP <80 MM HG: CPT | Performed by: INTERNAL MEDICINE

## 2024-08-07 PROCEDURE — 3074F SYST BP LT 130 MM HG: CPT | Performed by: INTERNAL MEDICINE

## 2024-08-07 PROCEDURE — 1159F MED LIST DOCD IN RCRD: CPT | Performed by: INTERNAL MEDICINE

## 2024-08-07 PROCEDURE — 93000 ELECTROCARDIOGRAM COMPLETE: CPT | Performed by: INTERNAL MEDICINE

## 2024-08-07 PROCEDURE — 1160F RVW MEDS BY RX/DR IN RCRD: CPT | Performed by: INTERNAL MEDICINE

## 2024-08-07 PROCEDURE — 99214 OFFICE O/P EST MOD 30 MIN: CPT | Performed by: INTERNAL MEDICINE

## 2024-08-07 RX ORDER — METOPROLOL SUCCINATE 25 MG/1
25 TABLET, EXTENDED RELEASE ORAL DAILY
Qty: 30 TABLET | Refills: 11 | Status: SHIPPED | OUTPATIENT
Start: 2024-08-07

## 2024-08-07 RX ORDER — PANTOPRAZOLE SODIUM 40 MG/1
40 TABLET, DELAYED RELEASE ORAL 2 TIMES DAILY
Qty: 90 TABLET | Refills: 3 | Status: SHIPPED | OUTPATIENT
Start: 2024-08-07

## 2024-08-07 RX ORDER — ASPIRIN 81 MG/1
81 TABLET ORAL DAILY
Qty: 30 TABLET | Refills: 6 | Status: SHIPPED | OUTPATIENT
Start: 2024-08-07

## 2024-08-07 RX ORDER — RANOLAZINE 1000 MG/1
1000 TABLET, EXTENDED RELEASE ORAL 2 TIMES DAILY
Qty: 60 TABLET | Refills: 6 | Status: SHIPPED | OUTPATIENT
Start: 2024-08-07

## 2024-08-07 RX ORDER — ROSUVASTATIN CALCIUM 20 MG/1
20 TABLET, COATED ORAL DAILY
Qty: 90 TABLET | Refills: 3 | Status: SHIPPED | OUTPATIENT
Start: 2024-08-07

## 2024-08-07 NOTE — LETTER
August 7, 2024       No Recipients    Patient: Edgardo Winchester   YOB: 1975   Date of Visit: 8/7/2024     Dear Keara Bonilla PA-C:       Thank you for referring Edgardo Winchester to me for evaluation. Below are the relevant portions of my assessment and plan of care.    If you have questions, please do not hesitate to call me. I look forward to following Edgardo along with you.         Sincerely,        Caroline Bajwa MD        CC:   No Recipients    Caroline Bajwa MD  08/07/24 1227  Sign when Signing Visit  Chief Complaint   Patient presents with   • Hospital Follow Up Visit     Post cardiac cath July 2,2024 . Patient reports he feels better and a little stronger but palpitations are bothersome to him.  He has been taking Pletal along with Brilinta 90 mg BID and Aspirin 81 mg   • LABS     Current labs  in chart. Had labs July 2024 at [facility]   • Palpitations     Patient reports he has been having  fluttering , some times for hours. He has SOA  with fluttering . O2 saturation is always  normal 98-99 .  He is experiencing chest discomfort this morning   • Med Refill     Needs refills on Brilinta, Crestor Ranexa . Also Pletal if he is to continue taking .  Please send prescriptions to [facility]       CARDIAC COMPLAINTS  chest pressure/discomfort, fatigue, and palpitations        Subjective  Edgardo Winchester is a 49 y.o. male came in today for his hospital follow-up visit.  He has history of hypertension, hypercholesterolemia was diagnosed with ischemic heart disease in 2019 when he underwent stenting of the LAD.  Since then he has undergone multiple cardiac catheterization.  In 2021 he underwent stenting of the right coronary artery.  His last stress test in March of last year showed very good effort tolerance.  EF around 46% there was inferolateral infarct and staci-infarct ischemia.  We have been managing him medically.  He recently called and said that his chest pain is progressively  getting worse.  He underwent repeat cardiac catheterization and had stents placed both in the LAD and RCA.  He came in today with his wife earlier than his usual appointment because of fluttering sensation which keeps going on for hours together and also sharp chest pain which is different from the pain he had.  His lab work when he was at the hospital showed normal blood count, blood sugar of 226.  His cholesterol was 167 with triglyceride of 451 and LDL of 80.              Cardiac History  Past Surgical History:   Procedure Laterality Date   • CARDIAC CATHETERIZATION  07/28/2021    /Dr. Hammond- 90% RCA- 3.5x30 Resolute.   • CARDIAC CATHETERIZATION  10/04/2019    - FFR of RCA- 0.93   • CARDIAC CATHETERIZATION  04/04/2019    @ [facility]- 80% LAD- 3.0x26 Resolute   • CARDIAC CATHETERIZATION  07/02/2024    FFR LAD- 0.76. 3.0x22 [place]. 90% RCA- 3.5x8 Stent   • CARDIOVASCULAR STRESS TEST  07/21/2021    - Dobutamine- EF 60%. Inferior Ischemia   • CARDIOVASCULAR STRESS TEST  03/28/2023    (Mod) 13.45 Min.10.1 METS. 91% THR. 214/67.EF 46%. Inferolateral Infarct & Ischemia   • ECHO - CONVERTED  03/28/2023    EF 55%. LA- 4.2. Trace-Mild MR. AO- 3.8. RVSP- 12 mmHg   • OTHER SURGICAL HISTORY  08/24/2021    CTA LE- Tiny Dissection (R) CFA   • OTHER SURGICAL HISTORY  03/02/2023    Pulse O2- Normal   • US CAROTID UNILATERAL  03/28/2023    No sig lesions       Current Outpatient Medications   Medication Sig Dispense Refill   • aspirin 81 MG EC tablet Take 1 tablet by mouth Daily. 30 tablet 6   • cilostazol (PLETAL) 100 MG tablet Take 1 tablet by mouth Daily. 90 tablet 3   • dapagliflozin Propanediol 10 MG tablet Take 10 mg by mouth Daily.     • Dulaglutide (TRULICITY SC) Inject  under the skin into the appropriate area as directed 1 (One) Time Per Week.     • hydrOXYzine (ATARAX) 10 MG tablet Take 1 tablet by mouth Every Night.     • metFORMIN (GLUCOPHAGE) 500 MG tablet Take 1 tablet by mouth 2 (Two)  Times a Day With Meals.     • methocarbamol (ROBAXIN) 750 MG tablet Take 1 tablet by mouth 4 (Four) Times a Day As Needed for Muscle Spasms.     • nitroglycerin (NITROSTAT) 0.4 MG SL tablet Place 1 tablet under the tongue Every 5 (Five) Minutes As Needed for Chest Pain. Take no more than 3 doses in 15 minutes.     • pantoprazole (PROTONIX) 40 MG EC tablet Take 1 tablet by mouth 2 (Two) Times a Day. 90 tablet 3   • rosuvastatin (CRESTOR) 20 MG tablet Take 1 tablet by mouth Daily. 90 tablet 3   • ticagrelor (BRILINTA) 90 MG tablet tablet Take 1 tablet by mouth 2 (Two) Times a Day. 60 tablet 6   • metoprolol succinate XL (TOPROL-XL) 25 MG 24 hr tablet Take 1 tablet by mouth Daily. 30 tablet 11   • ranolazine (RANEXA) 1000 MG 12 hr tablet Take 1 tablet by mouth 2 (Two) Times a Day. 60 tablet 6     No current facility-administered medications for this visit.       Allergies  :  Patient has no known allergies.       Past Medical History:   Diagnosis Date   • Arthritis    • Depression    • Diabetes mellitus    • GERD (gastroesophageal reflux disease)    • Hyperlipidemia    • Hypertension        Social History     Socioeconomic History   • Marital status:    Tobacco Use   • Smoking status: Former     Current packs/day: 0.00     Average packs/day: 1.5 packs/day for 30.0 years (45.0 ttl pk-yrs)     Types: Cigarettes     Start date:      Quit date: 2019     Years since quittin.6   • Smokeless tobacco: Never   Vaping Use   • Vaping status: Never Used   Substance and Sexual Activity   • Alcohol use: Never   • Drug use: Never       Family History   Problem Relation Age of Onset   • Heart disease Mother         CABG in her 60's   • Heart attack Father          in his 60's with MI   • Schizophrenia Brother    • Hypertension Maternal Grandfather    • Heart disease Paternal Grandfather    • Supraventricular tachycardia Son        Review of Systems   Constitutional: Positive for malaise/fatigue. Negative for  "decreased appetite.   HENT:  Negative for congestion and sore throat.    Eyes:  Negative for blurred vision, double vision and visual disturbance.   Cardiovascular:  Positive for chest pain and palpitations.   Respiratory:  Negative for shortness of breath and snoring.    Endocrine: Negative for cold intolerance and heat intolerance.   Hematologic/Lymphatic: Negative for adenopathy. Does not bruise/bleed easily.   Skin:  Negative for itching, nail changes and skin cancer.   Musculoskeletal:  Negative for arthritis and myalgias.   Gastrointestinal:  Negative for abdominal pain, dysphagia and heartburn.   Genitourinary:  Negative for bladder incontinence and frequency.   Neurological:  Negative for dizziness, seizures and vertigo.   Psychiatric/Behavioral:  Negative for altered mental status.    Allergic/Immunologic: Negative for environmental allergies and hives.     Diabetes- Yes  Thyroid- normal    Objective    /64 (BP Location: Left arm, Patient Position: Sitting)   Pulse 94   Ht 190 cm (74.8\")   Wt 111 kg (244 lb 3.2 oz)   BMI 30.69 kg/m²     Vitals and nursing note reviewed.   Constitutional:       Appearance: Healthy appearance. Not in distress.   Eyes:      Conjunctiva/sclera: Conjunctivae normal.      Pupils: Pupils are equal, round, and reactive to light.   HENT:      Head: Normocephalic.   Pulmonary:      Effort: Pulmonary effort is normal.      Breath sounds: Normal breath sounds.   Cardiovascular:      PMI at left midclavicular line. Normal rate. Regular rhythm.      Murmurs: There is a grade 3/6 high frequency blowing holosystolic murmur at the apex.   Abdominal:      General: Bowel sounds are normal.      Palpations: Abdomen is soft.   Musculoskeletal: Normal range of motion.      Cervical back: Normal range of motion and neck supple. Skin:     General: Skin is warm and dry.   Neurological:      Mental Status: Alert, oriented to person, place, and time and oriented to person, place and time. "     ECG 12 Lead    Date/Time: 8/7/2024 12:27 PM  Performed by: Caroline Bajwa MD    Authorized by: Caroline Bajwa MD  Comparison: compared with previous ECG from 2/28/2023  Similar to previous ECG  Rhythm: sinus rhythm  Rate: normal  Conduction: left anterior fascicular block  QRS axis: left    Clinical impression: abnormal EKG              @ASSESSMENT/PLAN@  BMI is >= 30 and <35. (Class 1 Obesity). The following options were offered after discussion;: exercise counseling/recommendations and nutrition counseling/recommendations     Diagnoses and all orders for this visit:    1. Palpitations (Primary)  -     metoprolol succinate XL (TOPROL-XL) 25 MG 24 hr tablet; Take 1 tablet by mouth Daily.  Dispense: 30 tablet; Refill: 11  -     Holter Monitor - 72 Hour Up To 15 Days; Future    2. IHD (ischemic heart disease)  -     aspirin 81 MG EC tablet; Take 1 tablet by mouth Daily.  Dispense: 30 tablet; Refill: 6  -     ranolazine (RANEXA) 1000 MG 12 hr tablet; Take 1 tablet by mouth 2 (Two) Times a Day.  Dispense: 60 tablet; Refill: 6  -     ticagrelor (BRILINTA) 90 MG tablet tablet; Take 1 tablet by mouth 2 (Two) Times a Day.  Dispense: 60 tablet; Refill: 6    3. Primary hypertension    4. Hypercholesteremia  -     rosuvastatin (CRESTOR) 20 MG tablet; Take 1 tablet by mouth Daily.  Dispense: 90 tablet; Refill: 3    5. Type 2 diabetes mellitus without complication, without long-term current use of insulin    6. Shortness of breath    7. Sleep apnea in adult    8. Gastroesophageal reflux disease, unspecified whether esophagitis present  -     pantoprazole (PROTONIX) 40 MG EC tablet; Take 1 tablet by mouth 2 (Two) Times a Day.  Dispense: 90 tablet; Refill: 3    At baseline his heart rate is upper limit of normal.  His blood pressure was normal.  His EKG showed normal sinus rhythm, left axis deviation with nonspecific ST changes.  His clinical examination reveals a BMI of 31.  His cardiovascular examination is  otherwise unremarkable.    Regarding the palpitation, it could be from tachycardia but I cannot rule out other arrhythmia.  I start him on Toprol-XL 25 mg once a day.  I placed a Holter monitor for 5 days to see what kind of arrhythmia he has.  If it is only sinus tachycardia then we can increase the dose of beta-blockers but if he has other arrhythmia then class III antiarrhythmic medication may be needed or EP consult    Regarding his ischemic heart disease, he has undergone multiple stenting.  He is on aspirin and Brilinta.  At this time I increased the dose of Ranexa to 1000 mg twice a day    Regarding his hypertension, it is still within normal limit without any medication.  The Cozaar was stopped after the cardiac cath    Regarding his hypercholesterolemia, it is well-controlled with Crestor so continue the same    Regarding his diabetes is still elevated.  Talk to him about diet especially cutting down on the carbohydrate.  I also talked to him about use of Ozempic or Mounjaro which might help with his sugar also since he does have a problem getting Trulicity regularly.    Regarding his shortness of breath, it could be related to his metabolic syndrome    Regarding his sleep apnea, he is advised to follow-up with you    Regarding his reflux, continue pantoprazole.  He has no new symptoms.                        Electronically signed by Caroline Bajwa MD August 7, 2024 12:18 EDT

## 2024-08-07 NOTE — PROGRESS NOTES
Chief Complaint   Patient presents with   • Hospital Follow Up Visit     Post cardiac cath July 2,2024 . Patient reports he feels better and a little stronger but palpitations are bothersome to him.  He has been taking Pletal along with Brilinta 90 mg BID and Aspirin 81 mg   • LABS     Current labs  in chart. Had labs July 2024 at [facility]   • Palpitations     Patient reports he has been having  fluttering , some times for hours. He has SOA  with fluttering . O2 saturation is always  normal 98-99 .  He is experiencing chest discomfort this morning   • Med Refill     Needs refills on Brilinta, Crestor Ranexa . Also Pletal if he is to continue taking .  Please send prescriptions to [facility]       CARDIAC COMPLAINTS  chest pressure/discomfort, fatigue, and palpitations        Subjective   Edgardo Winchester is a 49 y.o. male came in today for his hospital follow-up visit.  He has history of hypertension, hypercholesterolemia was diagnosed with ischemic heart disease in 2019 when he underwent stenting of the LAD.  Since then he has undergone multiple cardiac catheterization.  In 2021 he underwent stenting of the right coronary artery.  His last stress test in March of last year showed very good effort tolerance.  EF around 46% there was inferolateral infarct and staci-infarct ischemia.  We have been managing him medically.  He recently called and said that his chest pain is progressively getting worse.  He underwent repeat cardiac catheterization and had stents placed both in the LAD and RCA.  He came in today with his wife earlier than his usual appointment because of fluttering sensation which keeps going on for hours together and also sharp chest pain which is different from the pain he had.  His lab work when he was at the hospital showed normal blood count, blood sugar of 226.  His cholesterol was 167 with triglyceride of 451 and LDL of 80.              Cardiac History  Past Surgical History:   Procedure  Laterality Date   • CARDIAC CATHETERIZATION  07/28/2021    /Dr. Hammond- 90% RCA- 3.5x30 Resolute.   • CARDIAC CATHETERIZATION  10/04/2019    - FFR of RCA- 0.93   • CARDIAC CATHETERIZATION  04/04/2019    @ [facility]- 80% LAD- 3.0x26 Resolute   • CARDIAC CATHETERIZATION  07/02/2024    FFR LAD- 0.76. 3.0x22 [place]. 90% RCA- 3.5x8 Stent   • CARDIOVASCULAR STRESS TEST  07/21/2021    - Dobutamine- EF 60%. Inferior Ischemia   • CARDIOVASCULAR STRESS TEST  03/28/2023    (Mod) 13.45 Min.10.1 METS. 91% THR. 214/67.EF 46%. Inferolateral Infarct & Ischemia   • ECHO - CONVERTED  03/28/2023    EF 55%. LA- 4.2. Trace-Mild MR. AO- 3.8. RVSP- 12 mmHg   • OTHER SURGICAL HISTORY  08/24/2021    CTA LE- Tiny Dissection (R) CFA   • OTHER SURGICAL HISTORY  03/02/2023    Pulse O2- Normal   • US CAROTID UNILATERAL  03/28/2023    No sig lesions       Current Outpatient Medications   Medication Sig Dispense Refill   • aspirin 81 MG EC tablet Take 1 tablet by mouth Daily. 30 tablet 6   • cilostazol (PLETAL) 100 MG tablet Take 1 tablet by mouth Daily. 90 tablet 3   • dapagliflozin Propanediol 10 MG tablet Take 10 mg by mouth Daily.     • Dulaglutide (TRULICITY SC) Inject  under the skin into the appropriate area as directed 1 (One) Time Per Week.     • hydrOXYzine (ATARAX) 10 MG tablet Take 1 tablet by mouth Every Night.     • metFORMIN (GLUCOPHAGE) 500 MG tablet Take 1 tablet by mouth 2 (Two) Times a Day With Meals.     • methocarbamol (ROBAXIN) 750 MG tablet Take 1 tablet by mouth 4 (Four) Times a Day As Needed for Muscle Spasms.     • nitroglycerin (NITROSTAT) 0.4 MG SL tablet Place 1 tablet under the tongue Every 5 (Five) Minutes As Needed for Chest Pain. Take no more than 3 doses in 15 minutes.     • pantoprazole (PROTONIX) 40 MG EC tablet Take 1 tablet by mouth 2 (Two) Times a Day. 90 tablet 3   • rosuvastatin (CRESTOR) 20 MG tablet Take 1 tablet by mouth Daily. 90 tablet 3   • ticagrelor (BRILINTA) 90 MG tablet  tablet Take 1 tablet by mouth 2 (Two) Times a Day. 60 tablet 6   • metoprolol succinate XL (TOPROL-XL) 25 MG 24 hr tablet Take 1 tablet by mouth Daily. 30 tablet 11   • ranolazine (RANEXA) 1000 MG 12 hr tablet Take 1 tablet by mouth 2 (Two) Times a Day. 60 tablet 6     No current facility-administered medications for this visit.       Allergies  :  Patient has no known allergies.       Past Medical History:   Diagnosis Date   • Arthritis    • Depression    • Diabetes mellitus    • GERD (gastroesophageal reflux disease)    • Hyperlipidemia    • Hypertension        Social History     Socioeconomic History   • Marital status:    Tobacco Use   • Smoking status: Former     Current packs/day: 0.00     Average packs/day: 1.5 packs/day for 30.0 years (45.0 ttl pk-yrs)     Types: Cigarettes     Start date:      Quit date:      Years since quittin.6   • Smokeless tobacco: Never   Vaping Use   • Vaping status: Never Used   Substance and Sexual Activity   • Alcohol use: Never   • Drug use: Never       Family History   Problem Relation Age of Onset   • Heart disease Mother         CABG in her 60's   • Heart attack Father          in his 60's with MI   • Schizophrenia Brother    • Hypertension Maternal Grandfather    • Heart disease Paternal Grandfather    • Supraventricular tachycardia Son        Review of Systems   Constitutional: Positive for malaise/fatigue. Negative for decreased appetite.   HENT:  Negative for congestion and sore throat.    Eyes:  Negative for blurred vision, double vision and visual disturbance.   Cardiovascular:  Positive for chest pain and palpitations.   Respiratory:  Negative for shortness of breath and snoring.    Endocrine: Negative for cold intolerance and heat intolerance.   Hematologic/Lymphatic: Negative for adenopathy. Does not bruise/bleed easily.   Skin:  Negative for itching, nail changes and skin cancer.   Musculoskeletal:  Negative for arthritis and myalgias.  "  Gastrointestinal:  Negative for abdominal pain, dysphagia and heartburn.   Genitourinary:  Negative for bladder incontinence and frequency.   Neurological:  Negative for dizziness, seizures and vertigo.   Psychiatric/Behavioral:  Negative for altered mental status.    Allergic/Immunologic: Negative for environmental allergies and hives.     Diabetes- Yes  Thyroid- normal    Objective     /64 (BP Location: Left arm, Patient Position: Sitting)   Pulse 94   Ht 190 cm (74.8\")   Wt 111 kg (244 lb 3.2 oz)   BMI 30.69 kg/m²     Vitals and nursing note reviewed.   Constitutional:       Appearance: Healthy appearance. Not in distress.   Eyes:      Conjunctiva/sclera: Conjunctivae normal.      Pupils: Pupils are equal, round, and reactive to light.   HENT:      Head: Normocephalic.   Pulmonary:      Effort: Pulmonary effort is normal.      Breath sounds: Normal breath sounds.   Cardiovascular:      PMI at left midclavicular line. Normal rate. Regular rhythm.      Murmurs: There is a grade 3/6 high frequency blowing holosystolic murmur at the apex.   Abdominal:      General: Bowel sounds are normal.      Palpations: Abdomen is soft.   Musculoskeletal: Normal range of motion.      Cervical back: Normal range of motion and neck supple. Skin:     General: Skin is warm and dry.   Neurological:      Mental Status: Alert, oriented to person, place, and time and oriented to person, place and time.     ECG 12 Lead    Date/Time: 8/7/2024 12:27 PM  Performed by: Caroline Bajwa MD    Authorized by: Caroline Bajwa MD  Comparison: compared with previous ECG from 2/28/2023  Similar to previous ECG  Rhythm: sinus rhythm  Rate: normal  Conduction: left anterior fascicular block  QRS axis: left    Clinical impression: abnormal EKG              @ASSESSMENT/PLAN@  BMI is >= 30 and <35. (Class 1 Obesity). The following options were offered after discussion;: exercise counseling/recommendations and nutrition " counseling/recommendations     Diagnoses and all orders for this visit:    1. Palpitations (Primary)  -     metoprolol succinate XL (TOPROL-XL) 25 MG 24 hr tablet; Take 1 tablet by mouth Daily.  Dispense: 30 tablet; Refill: 11  -     Holter Monitor - 72 Hour Up To 15 Days; Future    2. IHD (ischemic heart disease)  -     aspirin 81 MG EC tablet; Take 1 tablet by mouth Daily.  Dispense: 30 tablet; Refill: 6  -     ranolazine (RANEXA) 1000 MG 12 hr tablet; Take 1 tablet by mouth 2 (Two) Times a Day.  Dispense: 60 tablet; Refill: 6  -     ticagrelor (BRILINTA) 90 MG tablet tablet; Take 1 tablet by mouth 2 (Two) Times a Day.  Dispense: 60 tablet; Refill: 6    3. Primary hypertension    4. Hypercholesteremia  -     rosuvastatin (CRESTOR) 20 MG tablet; Take 1 tablet by mouth Daily.  Dispense: 90 tablet; Refill: 3    5. Type 2 diabetes mellitus without complication, without long-term current use of insulin    6. Shortness of breath    7. Sleep apnea in adult    8. Gastroesophageal reflux disease, unspecified whether esophagitis present  -     pantoprazole (PROTONIX) 40 MG EC tablet; Take 1 tablet by mouth 2 (Two) Times a Day.  Dispense: 90 tablet; Refill: 3    At baseline his heart rate is upper limit of normal.  His blood pressure was normal.  His EKG showed normal sinus rhythm, left axis deviation with nonspecific ST changes.  His clinical examination reveals a BMI of 31.  His cardiovascular examination is otherwise unremarkable.    Regarding the palpitation, it could be from tachycardia but I cannot rule out other arrhythmia.  I start him on Toprol-XL 25 mg once a day.  I placed a Holter monitor for 5 days to see what kind of arrhythmia he has.  If it is only sinus tachycardia then we can increase the dose of beta-blockers but if he has other arrhythmia then class III antiarrhythmic medication may be needed or EP consult    Regarding his ischemic heart disease, he has undergone multiple stenting.  He is on aspirin and  Brilinta.  At this time I increased the dose of Ranexa to 1000 mg twice a day    Regarding his hypertension, it is still within normal limit without any medication.  The Cozaar was stopped after the cardiac cath    Regarding his hypercholesterolemia, it is well-controlled with Crestor so continue the same    Regarding his diabetes is still elevated.  Talk to him about diet especially cutting down on the carbohydrate.  I also talked to him about use of Ozempic or Mounjaro which might help with his sugar also since he does have a problem getting Trulicity regularly.    Regarding his shortness of breath, it could be related to his metabolic syndrome    Regarding his sleep apnea, he is advised to follow-up with you    Regarding his reflux, continue pantoprazole.  He has no new symptoms.                        Electronically signed by Caroline Bajwa MD August 7, 2024 12:18 EDT

## 2024-08-22 ENCOUNTER — TELEPHONE (OUTPATIENT)
Dept: CARDIOLOGY | Facility: CLINIC | Age: 49
End: 2024-08-22

## 2024-08-22 RX ORDER — METOPROLOL SUCCINATE 50 MG/1
50 TABLET, EXTENDED RELEASE ORAL DAILY
Qty: 30 TABLET | Refills: 11 | Status: SHIPPED | OUTPATIENT
Start: 2024-08-22

## 2024-08-23 ENCOUNTER — TELEPHONE (OUTPATIENT)
Dept: CARDIOLOGY | Facility: CLINIC | Age: 49
End: 2024-08-23
Payer: COMMERCIAL

## 2024-09-08 RX ORDER — LOSARTAN POTASSIUM 50 MG/1
50 TABLET ORAL DAILY
Qty: 30 TABLET | OUTPATIENT
Start: 2024-09-08

## 2024-10-29 RX ORDER — RANOLAZINE 500 MG/1
500 TABLET, EXTENDED RELEASE ORAL 2 TIMES DAILY
Qty: 60 TABLET | Refills: 3 | OUTPATIENT
Start: 2024-10-29

## 2024-10-29 NOTE — TELEPHONE ENCOUNTER
Rx Refill Note  Requested Prescriptions     Pending Prescriptions Disp Refills    ranolazine (RANEXA) 500 MG 12 hr tablet [Pharmacy Med Name: RANOLAZINE 500MG ER TABLETS] 60 tablet 3     Sig: TAKE 1 TABLET BY MOUTH TWICE DAILY      Last office visit with prescribing clinician: 3/12/2024   Last telemedicine visit with prescribing clinician: Visit date not found   Next office visit with prescribing clinician: Visit date not found                         Would you like a call back once the refill request has been completed: [] Yes [] No    If the office needs to give you a call back, can they leave a voicemail: [] Yes [] No    Tara Mclean CMA  10/29/24, 09:19 EDT

## 2024-12-10 RX ORDER — CILOSTAZOL 100 MG/1
100 TABLET ORAL DAILY
Qty: 90 TABLET | Refills: 3 | Status: SHIPPED | OUTPATIENT
Start: 2024-12-10

## 2025-01-24 ENCOUNTER — TELEPHONE (OUTPATIENT)
Dept: CARDIOLOGY | Facility: CLINIC | Age: 50
End: 2025-01-24
Payer: COMMERCIAL

## 2025-01-24 NOTE — TELEPHONE ENCOUNTER
Pt's wife called. Pt is having severe shoulder blade pain with SOB, palpitations. Also having significant groin pain. Has had since cath but significantly worse today. Pt is going to ER for evaluation.